# Patient Record
Sex: MALE | Race: OTHER | NOT HISPANIC OR LATINO | URBAN - METROPOLITAN AREA
[De-identification: names, ages, dates, MRNs, and addresses within clinical notes are randomized per-mention and may not be internally consistent; named-entity substitution may affect disease eponyms.]

---

## 2018-05-01 ENCOUNTER — OUTPATIENT (OUTPATIENT)
Dept: OUTPATIENT SERVICES | Facility: HOSPITAL | Age: 28
LOS: 1 days | End: 2018-05-01
Payer: MEDICAID

## 2018-05-01 DIAGNOSIS — Z98.89 OTHER SPECIFIED POSTPROCEDURAL STATES: Chronic | ICD-10-CM

## 2018-05-01 PROCEDURE — G9001: CPT

## 2018-05-16 DIAGNOSIS — R69 ILLNESS, UNSPECIFIED: ICD-10-CM

## 2022-11-25 PROBLEM — Z00.00 ENCOUNTER FOR PREVENTIVE HEALTH EXAMINATION: Status: ACTIVE | Noted: 2022-11-25

## 2022-11-28 ENCOUNTER — APPOINTMENT (OUTPATIENT)
Dept: CARDIOLOGY | Facility: CLINIC | Age: 32
End: 2022-11-28

## 2023-02-12 ENCOUNTER — NON-APPOINTMENT (OUTPATIENT)
Age: 33
End: 2023-02-12

## 2023-02-14 ENCOUNTER — NON-APPOINTMENT (OUTPATIENT)
Age: 33
End: 2023-02-14

## 2023-02-14 ENCOUNTER — APPOINTMENT (OUTPATIENT)
Dept: ORTHOPEDIC SURGERY | Facility: CLINIC | Age: 33
End: 2023-02-14
Payer: COMMERCIAL

## 2023-02-14 VITALS
OXYGEN SATURATION: 87 % | WEIGHT: 220 LBS | TEMPERATURE: 98 F | HEART RATE: 62 BPM | BODY MASS INDEX: 28.23 KG/M2 | HEIGHT: 74 IN | SYSTOLIC BLOOD PRESSURE: 129 MMHG | DIASTOLIC BLOOD PRESSURE: 78 MMHG

## 2023-02-14 DIAGNOSIS — M75.111 INCOMPLETE ROTATOR CUFF TEAR OR RUPTURE OF RIGHT SHOULDER, NOT SPECIFIED AS TRAUMATIC: ICD-10-CM

## 2023-02-14 PROCEDURE — 99203 OFFICE O/P NEW LOW 30 MIN: CPT

## 2023-02-15 NOTE — PHYSICAL EXAM
[UE] : Sensory: Intact in bilateral upper extremities [B.R.] : biceps 2+ and symmetric bilaterally [Rad] : radial 2+ and symmetric bilaterally [de-identified] : Pt is a pleasant 32 year old male, AAOx3 with no apparent distress, normal BMI.  Physical examination of both shoulders reveals normal contours with no deformity, skin intact, with no signs of infection, no erythema, no swelling, no discoloration, no distal lymphedema, no patholaxity, no muscle atrophy noted. ROM of right shoulder reveals forward flexion to 160°,  external rotation 75 °,  IR to T11 . Motor strength 5/5 in ER, IR, and FF in the scapular plane. No neurological deficits noted. Speeds test positive. \par  [de-identified] : MR R shoulder from [facility] on 11/3/22 reviewed today showing rotator cuff tendinosis and fraying and full thickness defect of supraspinatus footprint without retraction.\par \par XR Left shoulder reviewed from [facility] 11/2/22: 4 views of the right shoulder demonstrate maintained joint spaces and with no acute fracture or dislocation.\par

## 2023-02-15 NOTE — DISCUSSION/SUMMARY
[de-identified] : Pt is a pleasant 32 year old male, with right shoulder pain secondary to rotator cuff tear. A lengthy discussion was held regarding the patient’s condition and treatment options including all risks, benefits, prognosis and outcomes of each were discussed in detail. The patient was advised since he recently had a CSI that he would need to wait 3 months prior to any surgical intervention of his shoulder. The risks and benefits of surgery which would include arthroscopic rotator cuff repair were discussed. The post op rehabilitation was discussed. The patient is a  and would have to refrain from any heavy construction for 4 months post op. Knowing this the pt would prefer to exhaust all conservative measures before undergoing surgery. He was advised on the use of NSAIDS for pain control, icing, activity modification, and PT a script was provided today. If fails pt would consider surgical management in the Fall. The patient will contact me if there are any concerns otherwise follow up will be on a prn basis. The patient express understanding and all questions were answered.\par

## 2023-02-15 NOTE — CONSULT LETTER
[Dear  ___] : Dear  [unfilled], [FreeTextEntry1] : I had the pleasure of evaluating your patient in the office today for complaints of right shoulder pain secondary to rotator cuff tear . I have enclosed a copy of today's office notes for your charts and for your review.\par \par Sincerely, \par \par Tereso Ortiz M.D.\par Professor and \par Department of Orthopedic Surgery\par [facility]\par

## 2023-02-15 NOTE — HISTORY OF PRESENT ILLNESS
[de-identified] : 33 y/o Ambidextrous male who is works for [employer] presents with right shoulder pain due to an injury playing flag football on 10/16/2022. He was trying to catch a pass and went for a diving catch and hurt his shoulder when he landed on it. He was seen by Dr. Kwaku YOUNG and had MRI and X-rays done. He recommended surgery. He is doing physical therapy for 3 months, which did help. He had a cortisone injection in 1/2023 which also helped. He has no pain at rest but only with certain motions. He does not take any pain meds. He denies any numbness or tingling. He has full ROM but with pain and he has good strength. The pain does not keep him up at night. \par \par Hx: None

## 2023-04-14 ENCOUNTER — APPOINTMENT (OUTPATIENT)
Dept: ORTHOPEDIC SURGERY | Facility: CLINIC | Age: 33
End: 2023-04-14

## 2023-06-19 ENCOUNTER — TRANSCRIPTION ENCOUNTER (OUTPATIENT)
Age: 33
End: 2023-06-19

## 2023-06-19 ENCOUNTER — APPOINTMENT (OUTPATIENT)
Dept: GASTROENTEROLOGY | Facility: CLINIC | Age: 33
End: 2023-06-19
Payer: COMMERCIAL

## 2023-06-19 VITALS
OXYGEN SATURATION: 98 % | SYSTOLIC BLOOD PRESSURE: 126 MMHG | BODY MASS INDEX: 27.9 KG/M2 | HEART RATE: 72 BPM | WEIGHT: 206 LBS | TEMPERATURE: 97.5 F | HEIGHT: 72 IN | DIASTOLIC BLOOD PRESSURE: 72 MMHG

## 2023-06-19 DIAGNOSIS — R10.30 LOWER ABDOMINAL PAIN, UNSPECIFIED: ICD-10-CM

## 2023-06-19 DIAGNOSIS — Z78.9 OTHER SPECIFIED HEALTH STATUS: ICD-10-CM

## 2023-06-19 PROCEDURE — 99204 OFFICE O/P NEW MOD 45 MIN: CPT | Mod: 25

## 2023-06-19 PROCEDURE — 36415 COLL VENOUS BLD VENIPUNCTURE: CPT

## 2023-06-19 RX ORDER — CLINDAMYCIN PHOSPHATE 10 MG/ML
1 LOTION TOPICAL
Qty: 60 | Refills: 0 | Status: ACTIVE | COMMUNITY
Start: 2023-05-12

## 2023-06-19 RX ORDER — TRETINOIN 0.5 MG/G
0.05 CREAM TOPICAL
Qty: 20 | Refills: 0 | Status: ACTIVE | COMMUNITY
Start: 2023-05-12

## 2023-06-19 NOTE — HISTORY OF PRESENT ILLNESS
[FreeTextEntry1] : 32-year-old male in excellent health with a long history of IBS type symptoms here to establish care.  Surrogate another gastroenterologist last year with complaints of generalized abdominal cramping, excess gas/flatulence, loose and sometimes stringy stools (bowel movements generally only once a day however).  Has had bright red blood per rectum on a few occasions.  No anorectal pain.  No family history of colon cancer or polyps.  Had a colonoscopy at age 22 and again at age 26 for rectal bleeding.  No significant findings.  PMD gave a prescription for dicyclomine which she uses rarely and mainly for prophylaxis, seems to help a little bit.  Uses Gas-X as well which does not seem to help very much.  Had tried probiotics and felt no benefit from these.  Has never been worked up for celiac.

## 2023-06-19 NOTE — ASSESSMENT
[FreeTextEntry1] : Impression: Probable IBS.  Rule out celiac disease.  Local anorectal bleeding.  Colonoscopy x2 negative.  No indication to repeat.  Likely lactose/FODMAP intolerant.\par \par Plan: Send labs including C-reactive protein and celiac markers.  Lactose-free/low FODMAP diet given to patient.  Renew dicyclomine 20 mg.  Take at least every morning, additional doses as needed

## 2023-06-20 LAB
ALBUMIN SERPL ELPH-MCNC: 4.6 G/DL
ALP BLD-CCNC: 47 U/L
ALT SERPL-CCNC: 32 U/L
ANION GAP SERPL CALC-SCNC: 9 MMOL/L
AST SERPL-CCNC: 22 U/L
BILIRUB SERPL-MCNC: 0.6 MG/DL
BUN SERPL-MCNC: 11 MG/DL
CALCIUM SERPL-MCNC: 9.5 MG/DL
CHLORIDE SERPL-SCNC: 104 MMOL/L
CO2 SERPL-SCNC: 25 MMOL/L
CREAT SERPL-MCNC: 0.94 MG/DL
CRP SERPL-MCNC: <3 MG/L
EGFR: 111 ML/MIN/1.73M2
GLIADIN IGA SER QL: <5 UNITS
GLIADIN IGG SER QL: <5 UNITS
GLIADIN PEPTIDE IGA SER-ACNC: NEGATIVE
GLIADIN PEPTIDE IGG SER-ACNC: NEGATIVE
GLUCOSE SERPL-MCNC: 92 MG/DL
POTASSIUM SERPL-SCNC: 4.5 MMOL/L
PROT SERPL-MCNC: 6.6 G/DL
SODIUM SERPL-SCNC: 138 MMOL/L
TTG IGA SER IA-ACNC: <1.2 U/ML
TTG IGA SER-ACNC: NEGATIVE
TTG IGG SER IA-ACNC: <1.2 U/ML
TTG IGG SER IA-ACNC: NEGATIVE

## 2023-10-09 ENCOUNTER — APPOINTMENT (OUTPATIENT)
Dept: GASTROENTEROLOGY | Facility: CLINIC | Age: 33
End: 2023-10-09
Payer: COMMERCIAL

## 2023-10-09 VITALS
BODY MASS INDEX: 27.85 KG/M2 | SYSTOLIC BLOOD PRESSURE: 110 MMHG | HEIGHT: 74 IN | HEART RATE: 67 BPM | DIASTOLIC BLOOD PRESSURE: 68 MMHG | TEMPERATURE: 97.1 F | WEIGHT: 217 LBS | OXYGEN SATURATION: 99 %

## 2023-10-09 DIAGNOSIS — K62.5 HEMORRHAGE OF ANUS AND RECTUM: ICD-10-CM

## 2023-10-09 DIAGNOSIS — K58.9 IRRITABLE BOWEL SYNDROME W/OUT DIARRHEA: ICD-10-CM

## 2023-10-09 PROCEDURE — 99214 OFFICE O/P EST MOD 30 MIN: CPT

## 2023-10-25 ENCOUNTER — APPOINTMENT (OUTPATIENT)
Dept: ELECTROPHYSIOLOGY | Facility: CLINIC | Age: 33
End: 2023-10-25
Payer: COMMERCIAL

## 2023-10-25 ENCOUNTER — NON-APPOINTMENT (OUTPATIENT)
Age: 33
End: 2023-10-25

## 2023-10-25 VITALS
WEIGHT: 215 LBS | BODY MASS INDEX: 27.59 KG/M2 | SYSTOLIC BLOOD PRESSURE: 124 MMHG | HEIGHT: 74 IN | OXYGEN SATURATION: 97 % | RESPIRATION RATE: 16 BRPM | DIASTOLIC BLOOD PRESSURE: 84 MMHG | HEART RATE: 74 BPM

## 2023-10-25 DIAGNOSIS — R07.89 OTHER CHEST PAIN: ICD-10-CM

## 2023-10-25 DIAGNOSIS — Z87.891 PERSONAL HISTORY OF NICOTINE DEPENDENCE: ICD-10-CM

## 2023-10-25 DIAGNOSIS — Z87.898 PERSONAL HISTORY OF OTHER SPECIFIED CONDITIONS: ICD-10-CM

## 2023-10-25 DIAGNOSIS — Z82.3 FAMILY HISTORY OF STROKE: ICD-10-CM

## 2023-10-25 DIAGNOSIS — F41.9 ANXIETY DISORDER, UNSPECIFIED: ICD-10-CM

## 2023-10-25 DIAGNOSIS — Z86.59 PERSONAL HISTORY OF OTHER MENTAL AND BEHAVIORAL DISORDERS: ICD-10-CM

## 2023-10-25 PROCEDURE — 93000 ELECTROCARDIOGRAM COMPLETE: CPT

## 2023-10-25 PROCEDURE — 99204 OFFICE O/P NEW MOD 45 MIN: CPT | Mod: 25

## 2023-10-25 RX ORDER — HYOSCYAMINE SULFATE 0.38 MG/1
0.38 TABLET, EXTENDED RELEASE ORAL
Qty: 60 | Refills: 5 | Status: DISCONTINUED | COMMUNITY
Start: 2023-10-09 | End: 2023-10-25

## 2023-10-25 RX ORDER — DICYCLOMINE HYDROCHLORIDE 20 MG/1
20 TABLET ORAL
Qty: 270 | Refills: 3 | Status: DISCONTINUED | COMMUNITY
Start: 2023-06-19 | End: 2023-10-25

## 2023-10-25 RX ORDER — DICYCLOMINE HYDROCHLORIDE 20 MG/1
20 TABLET ORAL
Refills: 0 | Status: DISCONTINUED | COMMUNITY
End: 2023-10-25

## 2023-11-18 ENCOUNTER — APPOINTMENT (OUTPATIENT)
Dept: CARDIOLOGY | Facility: CLINIC | Age: 33
End: 2023-11-18

## 2023-11-21 ENCOUNTER — APPOINTMENT (OUTPATIENT)
Dept: CV DIAGNOSITCS | Facility: HOSPITAL | Age: 33
End: 2023-11-21

## 2023-12-08 ENCOUNTER — NON-APPOINTMENT (OUTPATIENT)
Age: 33
End: 2023-12-08

## 2023-12-08 DIAGNOSIS — R55 SYNCOPE AND COLLAPSE: ICD-10-CM

## 2023-12-20 ENCOUNTER — NON-APPOINTMENT (OUTPATIENT)
Age: 33
End: 2023-12-20

## 2023-12-20 ENCOUNTER — APPOINTMENT (OUTPATIENT)
Dept: ELECTROPHYSIOLOGY | Facility: CLINIC | Age: 33
End: 2023-12-20
Payer: COMMERCIAL

## 2023-12-20 VITALS
HEART RATE: 85 BPM | HEIGHT: 74 IN | SYSTOLIC BLOOD PRESSURE: 111 MMHG | OXYGEN SATURATION: 96 % | DIASTOLIC BLOOD PRESSURE: 63 MMHG | WEIGHT: 220 LBS | BODY MASS INDEX: 28.23 KG/M2

## 2023-12-20 DIAGNOSIS — R00.2 PALPITATIONS: ICD-10-CM

## 2023-12-20 PROCEDURE — 93000 ELECTROCARDIOGRAM COMPLETE: CPT

## 2023-12-20 PROCEDURE — 99213 OFFICE O/P EST LOW 20 MIN: CPT | Mod: 25

## 2023-12-20 RX ORDER — HYOSCYAMINE SULFATE 0.38 MG/1
0.38 TABLET, EXTENDED RELEASE ORAL
Refills: 0 | Status: ACTIVE | COMMUNITY
Start: 2023-12-20

## 2023-12-20 NOTE — REVIEW OF SYSTEMS
[Chest Discomfort] : chest discomfort [Palpitations] : palpitations [Negative] : Cardiovascular [FreeTextEntry5] : See HPI

## 2023-12-20 NOTE — DISCUSSION/SUMMARY
[FreeTextEntry1] : In summary, this is a 32-year-old man with IBS, rectal bleeding, anxiety, and right rotator cuff repair.  He was experiencing palpitations post his COVID infection.  These correlated to sinus rhythm on his event monitor.  He has no evidence of tacky or bradycardia arrhythmias.  His echocardiogram revealed no structural heart disease.  He can follow-up as needed.  Mr. Barksdale appeared to understand the whole discussion and verbalized that all of his questions were answered to his satisfaction.  Thank you for allowing me to be involved in the care of this pleasant man. Please feel free to contact me with any questions. [EKG obtained to assist in diagnosis and management of assessed problem(s)] : EKG obtained to assist in diagnosis and management of assessed problem(s)

## 2023-12-20 NOTE — CARDIOLOGY SUMMARY
[de-identified] : 10/25/2023: Sinus rhythm at 70 bpm 12/20/2023: sinus rhythm at 83 bpm [de-identified] : 30 day holter on 12/6/23 showed no evidence of tachy or elma arrythmias. [de-identified] : 10/27/23 LVEF 62%, No evidence of pericardial effusion.

## 2023-12-20 NOTE — HISTORY OF PRESENT ILLNESS
[FreeTextEntry1] : Referring Physician: Rao Lundy MD (PCP)   Dear Dr. Lundy   Mr. Barksdale was seen in the [facility] today. For our records, please allow me to summarize the history and my findings.   This pleasant 32-year-old man has no significant cardiovascular history. He has PMHx of IBS, rectal bleeding, anxiety, and right rotator cuff repair.  He originally presented with a complaint of palpitations.  He had COVID a month prior to presenting.   After that he had about 5 episodes of palpitations.  He stated they last 2 to 3 minutes, felt his heart fluttering.  Not associated with any dizziness or shortness of breath.  He stated prior to this he never had any palpitations.  He reports an episode of syncope last year on Thanksgiving which occurred in the setting of vaping and urination.  He felt his heart race prior and was diaphoretic.  He has never had an episode like that before or since.  He notes that his mother now in her mid 60s has been diagnosed with cardiomyopathy.  There is no family history of sudden cardiac death.  He wore a 30-day event monitor which revealed no evidence of arrhythmias.  He had 2 episodes of palpitations which correlated to sinus rhythm.  His echocardiogram was unremarkable.   Mr. Barksdale denies any recent history of shortness of breath, dizziness, or syncope.

## 2024-12-31 ENCOUNTER — APPOINTMENT (OUTPATIENT)
Dept: GASTROENTEROLOGY | Facility: CLINIC | Age: 34
End: 2024-12-31